# Patient Record
Sex: FEMALE | Race: BLACK OR AFRICAN AMERICAN | NOT HISPANIC OR LATINO | Employment: FULL TIME | URBAN - METROPOLITAN AREA
[De-identification: names, ages, dates, MRNs, and addresses within clinical notes are randomized per-mention and may not be internally consistent; named-entity substitution may affect disease eponyms.]

---

## 2020-01-01 ENCOUNTER — HOSPITAL ENCOUNTER (EMERGENCY)
Facility: HOSPITAL | Age: 50
End: 2020-04-11
Attending: EMERGENCY MEDICINE
Payer: MEDICAID

## 2020-01-01 VITALS
HEART RATE: 150 BPM | DIASTOLIC BLOOD PRESSURE: 56 MMHG | OXYGEN SATURATION: 78 % | TEMPERATURE: 98 F | WEIGHT: 157.13 LBS | SYSTOLIC BLOOD PRESSURE: 142 MMHG

## 2020-01-01 DIAGNOSIS — E88.09 HYPOALBUMINEMIA: ICD-10-CM

## 2020-01-01 DIAGNOSIS — E87.6 HYPOKALEMIA: ICD-10-CM

## 2020-01-01 DIAGNOSIS — T68.XXXA HYPOTHERMIA, INITIAL ENCOUNTER: Primary | ICD-10-CM

## 2020-01-01 DIAGNOSIS — I46.9 CARDIAC ARREST: ICD-10-CM

## 2020-01-01 DIAGNOSIS — R55 NEAR SYNCOPE: ICD-10-CM

## 2020-01-01 DIAGNOSIS — Z01.818 ENCOUNTER FOR INTUBATION: ICD-10-CM

## 2020-01-01 DIAGNOSIS — R07.9 CHEST PAIN: ICD-10-CM

## 2020-01-01 DIAGNOSIS — E83.51 HYPOCALCEMIA: ICD-10-CM

## 2020-01-01 LAB
ALBUMIN SERPL BCP-MCNC: 2.5 G/DL (ref 3.5–5.2)
ALBUMIN SERPL BCP-MCNC: 2.6 G/DL (ref 3.5–5.2)
ALP SERPL-CCNC: 68 U/L (ref 55–135)
ALP SERPL-CCNC: 73 U/L (ref 55–135)
ALT SERPL W/O P-5'-P-CCNC: 35 U/L (ref 10–44)
ALT SERPL W/O P-5'-P-CCNC: 65 U/L (ref 10–44)
AMPHET+METHAMPHET UR QL: NEGATIVE
ANION GAP SERPL CALC-SCNC: 16 MMOL/L (ref 8–16)
ANION GAP SERPL CALC-SCNC: 9 MMOL/L (ref 8–16)
AST SERPL-CCNC: 116 U/L (ref 10–40)
AST SERPL-CCNC: 75 U/L (ref 10–40)
B-HCG UR QL: NEGATIVE
BACTERIA #/AREA URNS HPF: NORMAL /HPF
BARBITURATES UR QL SCN>200 NG/ML: NEGATIVE
BASOPHILS # BLD AUTO: 0.03 K/UL (ref 0–0.2)
BASOPHILS NFR BLD: 0.2 % (ref 0–1.9)
BENZODIAZ UR QL SCN>200 NG/ML: NEGATIVE
BILIRUB SERPL-MCNC: 0.2 MG/DL (ref 0.1–1)
BILIRUB SERPL-MCNC: 0.3 MG/DL (ref 0.1–1)
BILIRUB UR QL STRIP: NEGATIVE
BNP SERPL-MCNC: 34 PG/ML (ref 0–99)
BUN SERPL-MCNC: 11 MG/DL (ref 6–20)
BUN SERPL-MCNC: 14 MG/DL (ref 6–20)
BZE UR QL SCN: NEGATIVE
CALCIUM SERPL-MCNC: 6.8 MG/DL (ref 8.7–10.5)
CALCIUM SERPL-MCNC: 8.7 MG/DL (ref 8.7–10.5)
CANNABINOIDS UR QL SCN: NEGATIVE
CHLORIDE SERPL-SCNC: 105 MMOL/L (ref 95–110)
CHLORIDE SERPL-SCNC: 118 MMOL/L (ref 95–110)
CLARITY UR: CLEAR
CO2 SERPL-SCNC: 17 MMOL/L (ref 23–29)
CO2 SERPL-SCNC: 19 MMOL/L (ref 23–29)
COLOR UR: YELLOW
CREAT SERPL-MCNC: 0.8 MG/DL (ref 0.5–1.4)
CREAT SERPL-MCNC: 1.3 MG/DL (ref 0.5–1.4)
CREAT UR-MCNC: 148.1 MG/DL (ref 15–325)
DIFFERENTIAL METHOD: ABNORMAL
EOSINOPHIL # BLD AUTO: 0.1 K/UL (ref 0–0.5)
EOSINOPHIL NFR BLD: 0.6 % (ref 0–8)
ERYTHROCYTE [DISTWIDTH] IN BLOOD BY AUTOMATED COUNT: 17.7 % (ref 11.5–14.5)
EST. GFR  (AFRICAN AMERICAN): 55 ML/MIN/1.73 M^2
EST. GFR  (AFRICAN AMERICAN): >60 ML/MIN/1.73 M^2
EST. GFR  (NON AFRICAN AMERICAN): 48 ML/MIN/1.73 M^2
EST. GFR  (NON AFRICAN AMERICAN): >60 ML/MIN/1.73 M^2
ETHANOL SERPL-MCNC: <10 MG/DL
GLUCOSE SERPL-MCNC: 130 MG/DL (ref 70–110)
GLUCOSE SERPL-MCNC: 353 MG/DL (ref 70–110)
GLUCOSE UR QL STRIP: NEGATIVE
HCT VFR BLD AUTO: 27.4 % (ref 37–48.5)
HGB BLD-MCNC: 7.8 G/DL (ref 12–16)
HGB UR QL STRIP: ABNORMAL
HIV 1+2 AB+HIV1 P24 AG SERPL QL IA: NEGATIVE
IMM GRANULOCYTES # BLD AUTO: 0.2 K/UL (ref 0–0.04)
IMM GRANULOCYTES NFR BLD AUTO: 1.5 % (ref 0–0.5)
INFLUENZA A, MOLECULAR: NEGATIVE
INFLUENZA B, MOLECULAR: NEGATIVE
KETONES UR QL STRIP: NEGATIVE
LEUKOCYTE ESTERASE UR QL STRIP: NEGATIVE
LYMPHOCYTES # BLD AUTO: 3.9 K/UL (ref 1–4.8)
LYMPHOCYTES NFR BLD: 28.8 % (ref 18–48)
MCH RBC QN AUTO: 20.9 PG (ref 27–31)
MCHC RBC AUTO-ENTMCNC: 28.5 G/DL (ref 32–36)
MCV RBC AUTO: 74 FL (ref 82–98)
METHADONE UR QL SCN>300 NG/ML: NEGATIVE
MICROSCOPIC COMMENT: NORMAL
MONOCYTES # BLD AUTO: 0.5 K/UL (ref 0.3–1)
MONOCYTES NFR BLD: 3.5 % (ref 4–15)
NEUTROPHILS # BLD AUTO: 8.8 K/UL (ref 1.8–7.7)
NEUTROPHILS NFR BLD: 65.4 % (ref 38–73)
NITRITE UR QL STRIP: NEGATIVE
NRBC BLD-RTO: 0 /100 WBC
OPIATES UR QL SCN: NEGATIVE
PCP UR QL SCN>25 NG/ML: NEGATIVE
PH UR STRIP: 6 [PH] (ref 5–8)
PLATELET # BLD AUTO: 186 K/UL (ref 150–350)
PMV BLD AUTO: 9.3 FL (ref 9.2–12.9)
POCT GLUCOSE: 180 MG/DL (ref 70–110)
POTASSIUM SERPL-SCNC: 2.3 MMOL/L (ref 3.5–5.1)
POTASSIUM SERPL-SCNC: 2.5 MMOL/L (ref 3.5–5.1)
PROT SERPL-MCNC: 4.8 G/DL (ref 6–8.4)
PROT SERPL-MCNC: 5.2 G/DL (ref 6–8.4)
PROT UR QL STRIP: ABNORMAL
RBC # BLD AUTO: 3.73 M/UL (ref 4–5.4)
RBC #/AREA URNS HPF: 3 /HPF (ref 0–4)
SARS-COV-2 RDRP RESP QL NAA+PROBE: NEGATIVE
SARS-COV-2 RDRP RESP QL NAA+PROBE: NEGATIVE
SODIUM SERPL-SCNC: 140 MMOL/L (ref 136–145)
SODIUM SERPL-SCNC: 144 MMOL/L (ref 136–145)
SP GR UR STRIP: 1.02 (ref 1–1.03)
SPECIMEN SOURCE: NORMAL
SQUAMOUS #/AREA URNS HPF: 1 /HPF
TOXICOLOGY INFORMATION: NORMAL
TROPONIN I SERPL DL<=0.01 NG/ML-MCNC: 0.01 NG/ML (ref 0–0.03)
TROPONIN I SERPL DL<=0.01 NG/ML-MCNC: 0.05 NG/ML (ref 0–0.03)
TSH SERPL DL<=0.005 MIU/L-ACNC: 2.17 UIU/ML (ref 0.4–4)
TSH SERPL DL<=0.005 MIU/L-ACNC: 2.96 UIU/ML (ref 0.4–4)
URN SPEC COLLECT METH UR: ABNORMAL
UROBILINOGEN UR STRIP-ACNC: NEGATIVE EU/DL
WBC # BLD AUTO: 13.41 K/UL (ref 3.9–12.7)
WBC #/AREA URNS HPF: 1 /HPF (ref 0–5)
WBC CLUMPS URNS QL MICRO: NORMAL
YEAST URNS QL MICRO: NORMAL

## 2020-01-01 PROCEDURE — 86703 HIV-1/HIV-2 1 RESULT ANTBDY: CPT

## 2020-01-01 PROCEDURE — 93010 EKG 12-LEAD: ICD-10-PCS | Mod: ,,, | Performed by: INTERNAL MEDICINE

## 2020-01-01 PROCEDURE — 80320 DRUG SCREEN QUANTALCOHOLS: CPT

## 2020-01-01 PROCEDURE — 99291 CRITICAL CARE FIRST HOUR: CPT | Mod: 25

## 2020-01-01 PROCEDURE — 87040 BLOOD CULTURE FOR BACTERIA: CPT | Mod: 59

## 2020-01-01 PROCEDURE — 63600175 PHARM REV CODE 636 W HCPCS: Performed by: EMERGENCY MEDICINE

## 2020-01-01 PROCEDURE — U0002 COVID-19 LAB TEST NON-CDC: HCPCS

## 2020-01-01 PROCEDURE — 96374 THER/PROPH/DIAG INJ IV PUSH: CPT

## 2020-01-01 PROCEDURE — 92950 HEART/LUNG RESUSCITATION CPR: CPT

## 2020-01-01 PROCEDURE — 36415 COLL VENOUS BLD VENIPUNCTURE: CPT

## 2020-01-01 PROCEDURE — 80053 COMPREHEN METABOLIC PANEL: CPT

## 2020-01-01 PROCEDURE — 82962 GLUCOSE BLOOD TEST: CPT

## 2020-01-01 PROCEDURE — 85025 COMPLETE CBC W/AUTO DIFF WBC: CPT

## 2020-01-01 PROCEDURE — 93010 ELECTROCARDIOGRAM REPORT: CPT | Mod: ,,, | Performed by: INTERNAL MEDICINE

## 2020-01-01 PROCEDURE — 81025 URINE PREGNANCY TEST: CPT

## 2020-01-01 PROCEDURE — 25000003 PHARM REV CODE 250

## 2020-01-01 PROCEDURE — 87502 INFLUENZA DNA AMP PROBE: CPT

## 2020-01-01 PROCEDURE — 63600175 PHARM REV CODE 636 W HCPCS

## 2020-01-01 PROCEDURE — 81000 URINALYSIS NONAUTO W/SCOPE: CPT | Mod: 59

## 2020-01-01 PROCEDURE — 93005 ELECTROCARDIOGRAM TRACING: CPT | Mod: 59

## 2020-01-01 PROCEDURE — 84484 ASSAY OF TROPONIN QUANT: CPT

## 2020-01-01 PROCEDURE — 83880 ASSAY OF NATRIURETIC PEPTIDE: CPT

## 2020-01-01 PROCEDURE — 96375 TX/PRO/DX INJ NEW DRUG ADDON: CPT

## 2020-01-01 PROCEDURE — 31500 INSERT EMERGENCY AIRWAY: CPT

## 2020-01-01 PROCEDURE — 51702 INSERT TEMP BLADDER CATH: CPT

## 2020-01-01 PROCEDURE — 84443 ASSAY THYROID STIM HORMONE: CPT

## 2020-01-01 PROCEDURE — 80307 DRUG TEST PRSMV CHEM ANLYZR: CPT

## 2020-01-01 PROCEDURE — 25000003 PHARM REV CODE 250: Performed by: EMERGENCY MEDICINE

## 2020-01-01 RX ORDER — CALCIUM CHLORIDE INJECTION 100 MG/ML
INJECTION, SOLUTION INTRAVENOUS CODE/TRAUMA/SEDATION MEDICATION
Status: COMPLETED | OUTPATIENT
Start: 2020-01-01 | End: 2020-01-01

## 2020-01-01 RX ORDER — PROPOFOL 10 MG/ML
INJECTION, EMULSION INTRAVENOUS
Status: DISCONTINUED
Start: 2020-01-01 | End: 2020-01-01 | Stop reason: HOSPADM

## 2020-01-01 RX ORDER — NOREPINEPHRINE BITARTRATE/D5W 4MG/250ML
0.05 PLASTIC BAG, INJECTION (ML) INTRAVENOUS CONTINUOUS
Status: DISCONTINUED | OUTPATIENT
Start: 2020-01-01 | End: 2020-01-01 | Stop reason: HOSPADM

## 2020-01-01 RX ORDER — NOREPINEPHRINE BITARTRATE/D5W 4MG/250ML
PLASTIC BAG, INJECTION (ML) INTRAVENOUS
Status: DISCONTINUED
Start: 2020-01-01 | End: 2020-01-01 | Stop reason: HOSPADM

## 2020-01-01 RX ORDER — POTASSIUM CHLORIDE 7.45 MG/ML
10 INJECTION INTRAVENOUS
Status: DISPENSED | OUTPATIENT
Start: 2020-01-01 | End: 2020-01-01

## 2020-01-01 RX ORDER — ONDANSETRON 2 MG/ML
4 INJECTION INTRAMUSCULAR; INTRAVENOUS
Status: COMPLETED | OUTPATIENT
Start: 2020-01-01 | End: 2020-01-01

## 2020-01-01 RX ORDER — SODIUM BICARBONATE 1 MEQ/ML
SYRINGE (ML) INTRAVENOUS CODE/TRAUMA/SEDATION MEDICATION
Status: COMPLETED | OUTPATIENT
Start: 2020-01-01 | End: 2020-01-01

## 2020-01-01 RX ORDER — EPINEPHRINE 0.1 MG/ML
INJECTION INTRAVENOUS CODE/TRAUMA/SEDATION MEDICATION
Status: COMPLETED | OUTPATIENT
Start: 2020-01-01 | End: 2020-01-01

## 2020-01-01 RX ADMIN — EPINEPHRINE 1 MG: 0.1 INJECTION, SOLUTION ENDOTRACHEAL; INTRACARDIAC; INTRAVENOUS at 09:04

## 2020-01-01 RX ADMIN — SODIUM CHLORIDE, SODIUM LACTATE, POTASSIUM CHLORIDE, AND CALCIUM CHLORIDE 1000 ML: .6; .31; .03; .02 INJECTION, SOLUTION INTRAVENOUS at 07:04

## 2020-01-01 RX ADMIN — ONDANSETRON 4 MG: 2 INJECTION INTRAMUSCULAR; INTRAVENOUS at 07:04

## 2020-01-01 RX ADMIN — SODIUM BICARBONATE 50 MEQ: 84 INJECTION, SOLUTION INTRAVENOUS at 09:04

## 2020-01-01 RX ADMIN — CALCIUM CHLORIDE 1 G: 100 INJECTION INTRAVENOUS; INTRAVENTRICULAR at 09:04

## 2020-01-01 RX ADMIN — POTASSIUM CHLORIDE 10 MEQ: 7.46 INJECTION, SOLUTION INTRAVENOUS at 09:04

## 2020-04-10 NOTE — ED PROVIDER NOTES
SCRIBE #1 NOTE: I, Francis Sevilla, am scribing for, and in the presence of, Haider Naik MD. I have scribed the entire note.       History     Chief Complaint   Patient presents with    Chest Pain     cp and abd pain at work today     Review of patient's allergies indicates:  No Known Allergies      History of Present Illness     HPI    4/10/2020, 6:30 PM  History obtained from the patient      History of Present Illness: Glory Scott Milligan is a 50 y.o. female patient with a history of hypertension who presents to the Emergency Department for evaluation of chest pain with associated witnessed ground level fall which onset suddenly earlier today at [facility]. However, upon EMS arrival, patient denied any chest pain and only complained abdominal pain. Per EMS, patient was diaphoretic and hypotensive (73/52) on scene. Patient states her abdominal pain has resolved at this time. Patient complains of a 1 days history of mild cough. Symptoms are episodic and moderate in severity. No mitigating or exacerbating factors reported. Associated sxs include fatigue. Patient denies any fever, chills, SOB, palpitations, leg swelling, n/v/d, and all other sxs at this time. Prior Tx includes none. No further complaints or concerns at this time.       Arrival mode: EMS/[facility]    PCP: Primary Doctor No     Past Medical History:  Hypertension    Past Surgical History:  History reviewed. No pertinent surgical history.    Family History:  History reviewed. No pertinent family history.    Social History:  Social History Main Topics    Smoking status: Unknown if ever smoked    Smokeless tobacco: Unknown if ever used    Alcohol Use: Unknown drinking history    Drug Use: Unknown if ever used    Sexual Activity: Unknown          Review of Systems     Review of Systems   Constitutional: Positive for diaphoresis and fatigue. Negative for chills and fever.   HENT: Negative for sore throat.    Respiratory: Positive for cough. Negative for  shortness of breath.    Cardiovascular: Positive for chest pain. Negative for palpitations and leg swelling.   Gastrointestinal: Negative for diarrhea, nausea, rectal pain and vomiting.   Genitourinary: Negative for dysuria.   Musculoskeletal: Negative for back pain.   Skin: Negative for rash.   Neurological: Negative for weakness.   Hematological: Does not bruise/bleed easily.   All other systems reviewed and are negative.       Physical Exam     Initial Vitals [04/10/20 1836]   BP Pulse Resp Temp SpO2   (!) 96/51 86 18 (!) 93.8 °F (34.3 °C) 96 %      MAP       --          Physical Exam  Nursing Notes and Vital Signs Reviewed.  Constitutional: Well-developed and well-nourished. Patient is in NAD.  Head: Atraumatic. Normocephalic.  Eyes: PERRL. EOM intact. Conjunctivae are not pale. No scleral icterus.  ENT: Mucous membranes are moist. Oropharynx is clear and symmetric.    Neck: Supple. Full ROM. No lymphadenopathy.  Cardiovascular: Regular rate. Regular rhythm. No murmurs, rubs, or gallops. Distal pulses are 2+ and symmetric. No leg swelling.  Pulmonary/Chest: No respiratory distress. Clear to auscultation bilaterally. No wheezing or rales.  Abdominal: Soft and non-distended.  There is no tenderness.  No rebound, guarding, or rigidity. Good bowel sounds.  Genitourinary: No CVA tenderness  Musculoskeletal: Moves all extremities. No obvious deformities. No calf tenderness.  Skin: Dry.  Cool to touch.  Neurological:  Appears sleepy.  Normal speech.  No acute focal neurological deficits are appreciated.  Psychiatric: Normal affect. Good eye contact. Appropriate in content.     ED Course   Critical Care  Date/Time: 4/10/2020 7:02 PM  Performed by: Haider Naik MD  Authorized by: Haider Naik MD   Direct patient critical care time: 35 minutes  Additional history critical care time: 10 minutes  Ordering / reviewing critical care time: 10 minutes  Documentation critical care time: 5 minutes  Total critical care time  (exclusive of procedural time) : 60 minutes  Critical care time was exclusive of separately billable procedures and treating other patients and teaching time.  Critical care was necessary to treat or prevent imminent or life-threatening deterioration of the following conditions: hypothermia, cardiac arrest.  Critical care was time spent personally by me on the following activities: development of treatment plan with patient or surrogate, blood draw for specimens, interpretation of cardiac output measurements, evaluation of patient's response to treatment, examination of patient, obtaining history from patient or surrogate, ordering and performing treatments and interventions, ordering and review of laboratory studies, ordering and review of radiographic studies, pulse oximetry, re-evaluation of patient's condition and review of old charts.    Intubation  Date/Time: 4/10/2020 9:31 PM  Performed by: Haider Naik MD  Authorized by: Haider Naik MD   Consent Done: Emergent Situation  Indications: respiratory failure,  airway protection and  hypoxemia  Intubation method: direct  Patient status: sedated  Preoxygenation: nasal cannula  Sedatives: propofol  Paralytic: none  Laryngoscope size: Mac 4  Tube size: 7.0 mm  Tube type: cuffed  Number of attempts: 1  Cricoid pressure: yes  Cords visualized: yes  Post-procedure assessment: chest rise and ETCO2 monitor  Breath sounds: equal and absent over the epigastrium  Cuff inflated: yes  Tube secured with: adhesive tape  Complications: No  Specimens: No  Implants: No  Comments: Patient emergently intubated during code blue. Patient  prior to xray confirmation of placement.        ED Vital Signs:  Vitals:    04/10/20 1836 04/10/20 1850 04/10/20 1900   BP: (!) 96/51  (!) 83/51   Pulse: 86 77 82   Resp: 18  20   Temp: (!) 93.8 °F (34.3 °C)     TempSrc: Axillary     SpO2: 96%  96%   Weight: 71.3 kg (157 lb 1.6 oz)         Abnormal Lab Results:  Labs Reviewed    COMPREHENSIVE METABOLIC PANEL - Abnormal; Notable for the following components:       Result Value    Potassium 2.3 (*)     Chloride 118 (*)     CO2 17 (*)     Glucose 130 (*)     Calcium 6.8 (*)     Total Protein 4.8 (*)     Albumin 2.5 (*)     AST 75 (*)     All other components within normal limits    Narrative:     K, CA critical result(s) called and verbal readback obtained from   Uday Worrell RN by [facility] 04/10/2020 19:51   URINALYSIS, REFLEX TO URINE CULTURE - Abnormal; Notable for the following components:    Protein, UA Trace (*)     Occult Blood UA 1+ (*)     All other components within normal limits    Narrative:     Preferred Collection Type->Urine, Clean Catch   CBC W/ AUTO DIFFERENTIAL - Abnormal; Notable for the following components:    WBC 13.41 (*)     RBC 3.73 (*)     Hemoglobin 7.8 (*)     Hematocrit 27.4 (*)     Mean Corpuscular Volume 74 (*)     Mean Corpuscular Hemoglobin 20.9 (*)     Mean Corpuscular Hemoglobin Conc 28.5 (*)     RDW 17.7 (*)     Immature Granulocytes 1.5 (*)     Gran # (ANC) 8.8 (*)     Immature Grans (Abs) 0.20 (*)     Mono% 3.5 (*)     All other components within normal limits   COMPREHENSIVE METABOLIC PANEL - Abnormal; Notable for the following components:    Potassium 2.5 (*)     CO2 19 (*)     Glucose 353 (*)     Total Protein 5.2 (*)     Albumin 2.6 (*)      (*)     ALT 65 (*)     eGFR if  55 (*)     eGFR if non  48 (*)     All other components within normal limits    Narrative:     K critical result(s) called and verbal readback obtained from Toni Lopes RN by [facility] 04/10/2020 20:44   TROPONIN I - Abnormal; Notable for the following components:    Troponin I 0.053 (*)     All other components within normal limits   POCT GLUCOSE - Abnormal; Notable for the following components:    POCT Glucose 180 (*)     All other components within normal limits   INFLUENZA A & B BY MOLECULAR   CULTURE, BLOOD   CULTURE, BLOOD   TROPONIN I    PREGNANCY TEST, URINE RAPID   TSH   SARS-COV-2 RNA AMPLIFICATION, QUAL    Narrative:     What symptom criteria does the patient meet?->Cough   HIV 1 / 2 ANTIBODY   B-TYPE NATRIURETIC PEPTIDE   CBC W/ AUTO DIFFERENTIAL   B-TYPE NATRIURETIC PEPTIDE   TSH   URINALYSIS MICROSCOPIC    Narrative:     Preferred Collection Type->Urine, Clean Catch   SARS-COV-2 RNA AMPLIFICATION, QUAL    Narrative:     What symptom criteria does the patient meet?->Cough   DRUG SCREEN PANEL, URINE EMERGENCY   ALCOHOL,MEDICAL (ETHANOL)   LACTIC ACID, PLASMA   TROPONIN I        All Lab Results:  Results for orders placed or performed during the hospital encounter of 04/10/20   Influenza A & B by Molecular   Result Value Ref Range    Influenza A, Molecular Negative Negative    Influenza B, Molecular Negative Negative    Flu A & B Source Nasal swab    Comprehensive metabolic panel   Result Value Ref Range    Sodium 144 136 - 145 mmol/L    Potassium 2.3 (LL) 3.5 - 5.1 mmol/L    Chloride 118 (H) 95 - 110 mmol/L    CO2 17 (L) 23 - 29 mmol/L    Glucose 130 (H) 70 - 110 mg/dL    BUN, Bld 11 6 - 20 mg/dL    Creatinine 0.8 0.5 - 1.4 mg/dL    Calcium 6.8 (LL) 8.7 - 10.5 mg/dL    Total Protein 4.8 (L) 6.0 - 8.4 g/dL    Albumin 2.5 (L) 3.5 - 5.2 g/dL    Total Bilirubin 0.2 0.1 - 1.0 mg/dL    Alkaline Phosphatase 68 55 - 135 U/L    AST 75 (H) 10 - 40 U/L    ALT 35 10 - 44 U/L    Anion Gap 9 8 - 16 mmol/L    eGFR if African American >60 >60 mL/min/1.73 m^2    eGFR if non African American >60 >60 mL/min/1.73 m^2   Troponin I   Result Value Ref Range    Troponin I 0.012 0.000 - 0.026 ng/mL   Urinalysis, Reflex to Urine Culture Urine, Clean Catch   Result Value Ref Range    Specimen UA Urine, Clean Catch     Color, UA Yellow Yellow, Straw, Pricilla    Appearance, UA Clear Clear    pH, UA 6.0 5.0 - 8.0    Specific Gravity, UA 1.020 1.005 - 1.030    Protein, UA Trace (A) Negative    Glucose, UA Negative Negative    Ketones, UA Negative Negative    Bilirubin (UA)  Negative Negative    Occult Blood UA 1+ (A) Negative    Nitrite, UA Negative Negative    Urobilinogen, UA Negative <2.0 EU/dL    Leukocytes, UA Negative Negative   Pregnancy, urine rapid   Result Value Ref Range    Preg Test, Ur Negative    TSH   Result Value Ref Range    TSH 2.170 0.400 - 4.000 uIU/mL   COVID-19 Routine Screening   Result Value Ref Range    SARS-CoV-2 RNA, Amplification, Qual Negative Negative   HIV 1/2 Ag/Ab (4th Gen)   Result Value Ref Range    HIV 1/2 Ag/Ab Negative Negative   CBC auto differential   Result Value Ref Range    WBC 13.41 (H) 3.90 - 12.70 K/uL    RBC 3.73 (L) 4.00 - 5.40 M/uL    Hemoglobin 7.8 (L) 12.0 - 16.0 g/dL    Hematocrit 27.4 (L) 37.0 - 48.5 %    Mean Corpuscular Volume 74 (L) 82 - 98 fL    Mean Corpuscular Hemoglobin 20.9 (L) 27.0 - 31.0 pg    Mean Corpuscular Hemoglobin Conc 28.5 (L) 32.0 - 36.0 g/dL    RDW 17.7 (H) 11.5 - 14.5 %    Platelets 186 150 - 350 K/uL    MPV 9.3 9.2 - 12.9 fL    Immature Granulocytes 1.5 (H) 0.0 - 0.5 %    Gran # (ANC) 8.8 (H) 1.8 - 7.7 K/uL    Immature Grans (Abs) 0.20 (H) 0.00 - 0.04 K/uL    Lymph # 3.9 1.0 - 4.8 K/uL    Mono # 0.5 0.3 - 1.0 K/uL    Eos # 0.1 0.0 - 0.5 K/uL    Baso # 0.03 0.00 - 0.20 K/uL    nRBC 0 0 /100 WBC    Gran% 65.4 38.0 - 73.0 %    Lymph% 28.8 18.0 - 48.0 %    Mono% 3.5 (L) 4.0 - 15.0 %    Eosinophil% 0.6 0.0 - 8.0 %    Basophil% 0.2 0.0 - 1.9 %    Differential Method Automated    Comprehensive metabolic panel   Result Value Ref Range    Sodium 140 136 - 145 mmol/L    Potassium 2.5 (LL) 3.5 - 5.1 mmol/L    Chloride 105 95 - 110 mmol/L    CO2 19 (L) 23 - 29 mmol/L    Glucose 353 (H) 70 - 110 mg/dL    BUN, Bld 14 6 - 20 mg/dL    Creatinine 1.3 0.5 - 1.4 mg/dL    Calcium 8.7 8.7 - 10.5 mg/dL    Total Protein 5.2 (L) 6.0 - 8.4 g/dL    Albumin 2.6 (L) 3.5 - 5.2 g/dL    Total Bilirubin 0.3 0.1 - 1.0 mg/dL    Alkaline Phosphatase 73 55 - 135 U/L     (H) 10 - 40 U/L    ALT 65 (H) 10 - 44 U/L    Anion Gap 16 8 - 16 mmol/L     eGFR if African American 55 (A) >60 mL/min/1.73 m^2    eGFR if non African American 48 (A) >60 mL/min/1.73 m^2   Brain natriuretic peptide   Result Value Ref Range    BNP 34 0 - 99 pg/mL   Troponin I   Result Value Ref Range    Troponin I 0.053 (H) 0.000 - 0.026 ng/mL   TSH   Result Value Ref Range    TSH 2.955 0.400 - 4.000 uIU/mL   Urinalysis Microscopic   Result Value Ref Range    RBC, UA 3 0 - 4 /hpf    WBC, UA 1 0 - 5 /hpf    WBC Clumps, UA None None-Rare    Bacteria Rare None-Occ /hpf    Yeast, UA None None    Squam Epithel, UA 1 /hpf    Microscopic Comment SEE COMMENT    COVID-19 Routine Screening   Result Value Ref Range    SARS-CoV-2 RNA, Amplification, Qual Negative Negative   POCT glucose   Result Value Ref Range    POCT Glucose 180 (H) 70 - 110 mg/dL         Imaging Results          X-Ray Chest AP Portable (Final result)  Result time 04/10/20 20:35:08    Final result by Shukri Ellis MD (04/10/20 20:35:08)                 Impression:      1.  Low lung volumes with perihilar and medial basilar amorphous increased density, concerning for possible bilateral pneumonia.  Clinical correlation is advised.  Atypical pneumonia and less likely underlying interstitial disease or pulmonary edema could have this appearance.  Clinical correlation is advised.    2.  Borderline cardiac silhouette size enlargement, likely accentuated by the low lung volumes.    3.  Incidental findings as noted above.      Electronically signed by: Shukri Ellis MD  Date:    04/10/2020  Time:    20:35             Narrative:    EXAMINATION:  XR CHEST AP PORTABLE    CLINICAL HISTORY:  Chest pain;    COMPARISON:  No comparison studies are available.    FINDINGS:  EKG leads overlie the chest which is rotated to the right.  Low lung volumes.  Amorphous increased density in the medial lung bases and perihilar regions.  Hair artifact overlies the upper lungs.  The cardiac silhouette size is borderline enlarged, possibly accentuated by the  low lung volumes.  The trachea is midline and the mediastinal width is normal. Negative for effusion or pneumothorax.  Pulmonary vasculature is normal. Negative for osseous abnormalities. Tortuous aorta.  Convex left curvature of the thoracic spine with marginal spondylosis.                                 The EKG was ordered, reviewed, and independently interpreted by the ED provider.  Interpretation time: 1831  Rate: 68 BPM  Rhythm: NSR  Interpretation: septal infarct, age undetermined. No STEMI.      The Emergency Provider reviewed the vital signs and test results, which are outlined above.     ED Discussion       9:33 PM: Patient went into bradycardia and PEA in ED. Patient received 5 epi, 2 bicarb, and 1 calcium, was emergently intubated, and was coded for 20 minutes without ROSC. TOD called at 21:26.     [facility]     ED Course as of Apr 10 2249   Fri Apr 10, 2020   1853 Merly alcantara started   Temp(!): 93.8 °F (34.3 °C) [BA]   1945 3L Bolus given, Norepinephrine ordered.    BP(!): 83/51 [BA]   1946 POCT Glucose(!): 180 [BA]   1952 Corrected calcium is 8.0    [BA]   1955 SARS-CoV-2 RNA, Amplification, Qual: Negative [BA]   2002 Repeating     Potassium(!!): 2.3 [BA]   2005 S/p 2L, still hypotensive. Starting levophed.     [BA]   2137 Patient decompensated, became unresponsive, lost a pulse. She had CPR started, and Epinephrine given. Levophed increased. She received Epi x5, Bicarb x2, Calcium x1, She was emergently intubated. There was blood tinged secretions from ETT. There was ROSC only transiently after first epi, but pulse was lost as well. Code ended after 20 minutes with no ROSC. Lab reports PT/INR undetectable, very high. Potassium had started to be repleted as well.     [BA]      ED Course User Index  [BA] Haider Naik MD     Medical Decision Making:   Clinical Tests:   Lab Tests: Ordered and Reviewed  Radiological Study: Ordered and Reviewed  Medical Tests: Reviewed and Ordered           ED  Medication(s):  Medications   calcium gluconate 1 g in dextrose 5 % 100 mL IVPB (has no administration in time range)   potassium chloride 10 mEq in 100 mL IVPB (has no administration in time range)   norepinephrine 4 mg in dextrose 5% 250 mL infusion (premix) (titrating) (has no administration in time range)   cefTRIAXone (ROCEPHIN) 2 g in dextrose 5 % 50 mL IVPB (has no administration in time range)   azithromycin 500 mg in dextrose 5 % 250 mL IVPB (ready to mix system) (has no administration in time range)   norepinephrine bitartrate-D5W 4 mg/250 mL (16 mcg/mL) infusion Soln (has no administration in time range)   propofoL (DIPRIVAN) 10 mg/mL infusion (  Canceled Entry 4/10/20 2115)   lactated ringers bolus 1,000 mL (1,000 mLs Intravenous New Bag 4/10/20 1940)   lactated ringers bolus 1,000 mL (1,000 mLs Intravenous New Bag 4/10/20 1936)   ondansetron injection 4 mg (4 mg Intravenous Given 4/10/20 1954)       New Prescriptions    No medications on file               Scribe Attestation:   Scribe #1: I performed the above scribed service and the documentation accurately describes the services I performed. I attest to the accuracy of the note.     Attending:   Physician Attestation Statement for Scribe #1: I, Haider Naik MD, personally performed the services described in this documentation, as scribed by Francis Sevilla, in my presence, and it is both accurate and complete.           Clinical Impression       ICD-10-CM ICD-9-CM   1. Hypothermia, initial encounter T68.XXXA 991.6   2. Chest pain R07.9 786.50   3. Near syncope R55 780.2   4. Hypokalemia E87.6 276.8   5. Hypocalcemia E83.51 275.41   6. Encounter for intubation Z01.818 V72.83   7. Hypoalbuminemia E88.09 273.8   8. Cardiac arrest I46.9 427.5       Disposition:   Disposition:          Haider Naik MD  04/10/20 8012

## 2020-04-11 NOTE — ED NOTES
Dr. Hernández was called to bedside to reevaluate the pt. She became agitated and unable to remain calm in the bed. Her breathing appeared slightly labored. Per the MD the levophed was to be initiated after her boluses were complete. The pts oxygen sats were 97 on room air.

## 2020-04-11 NOTE — ED NOTES
The pts color appeared ashen and irregular breathing pattern noted. The MD was called to bedside to evaluate the pt and possible intubation was suggested. The MD responded to start the levophed to correct her hypotension.

## 2020-04-11 NOTE — ED NOTES
"The pts breathing became more labored and she became more agitated and the MD was called to bedside to reevaluate the pt for possible intubation. The physcian stated "start her levophed". Primary nurse was actively initiating the levophed and was programming the pump to start.     "

## 2020-04-11 NOTE — ED NOTES
The pt became limp and her breathing ceased. The doctor was called again and respiratory was paged for intubation.

## 2020-04-11 NOTE — ED NOTES
Family updated on current situation. Informed  is coming to see pt. They will be allowed to see pt when body once released.

## 2020-04-11 NOTE — ED NOTES
Call placed to [facility]. Rule out for Medical History. Spoke with Jakob Arreola Ref. Number:[phone]

## 2020-04-11 NOTE — ED NOTES
"The pts daughter was called for more information because the pt is not talking and is unable to give any information.   Per her daughter the pt was having problems keeping her food down for 8 months ("stomach pain")   LKWT 1742. The daughter received a call from the pt while she was at work. She was her "normal self" and didn't verbalize any complaints or concerns to her daughter alan. "She was talking and laughing and she was fine"  Per her daughter she was at work and when her co worker turned around and saw the pt passed out on the floor. The pt works at [employer]) and was scheduled to work at 10 and is suppose to get off at 8pm.   The only medical history known to the family is acid reflux and HTN. The last time the pt c/o her abd was 4 weeks ago. Pt was seen by her PCP but has never been evaluated by a gastroenterologist.      "

## 2020-04-11 NOTE — ED NOTES
Patient belongings include a white ipone with pink case, id and insurance card inside zipped portion of cream purse, one gold cross necklace, one ofelia earring, one silver band with diamonds, one silver ring with stone in the middle:all placed inside specimen cup with pt label

## 2020-04-11 NOTE — ED NOTES
The charge nurse was informed that the pt needed to be moved to room 4 for higher acuity care.  The pts blood pressure was not improving despite the fluid resuscitation.

## 2020-04-11 NOTE — ED NOTES
The pt does not have IV access at this time. Two large bore IVs will be initiated and the pt will receive the two liters per MD orders. The pt is currently under a ronel hugger for an axillary temp charted as 93.8. The pt is not talking and only opens her eyes to voice. Pt follows simple commands but has to repeatedly be redirected.  The pts rectal temp was obtained and reported 97.6. The ronel hugger was removed and two blankets and socks were placed on the pt for comfort. The charge nurse was made aware of the pt situation.

## 2020-04-11 NOTE — ED NOTES
2056 RT was called by ER for ABG.   2100 RT was called by ER stating patient needed to be intubated.  2101 RT called for back up Therapist  2102 RT called by ER again requesting presence for intubation. RT was already at pt room attempting to dress out in appropriate PPE.   RT at bedside preparing for intubation procedure. CPR in progress.  Pt intubated by MD with 7.0 ETT secured 23 at the lip with twill tape by RT.  CPR and Handbagging continued until code called by MD.

## 2020-04-11 NOTE — ED NOTES
MD informed sister of pt death. At this time family cannot see pt prior to calling the . Family will see pt once the body is released

## 2020-04-11 NOTE — ED NOTES
The pt began to appear more restless and the MD was called to the bedside to evaluate the pt again. No new orders were recieved.

## 2020-04-13 LAB — POCT GLUCOSE: 263 MG/DL (ref 70–110)

## 2020-04-15 LAB
BACTERIA BLD CULT: NORMAL
BACTERIA BLD CULT: NORMAL